# Patient Record
Sex: FEMALE | Race: WHITE | Employment: UNEMPLOYED | URBAN - METROPOLITAN AREA
[De-identification: names, ages, dates, MRNs, and addresses within clinical notes are randomized per-mention and may not be internally consistent; named-entity substitution may affect disease eponyms.]

---

## 2022-07-23 ENCOUNTER — HOSPITAL ENCOUNTER (EMERGENCY)
Age: 62
Discharge: HOME | End: 2022-07-24
Attending: EMERGENCY MEDICINE
Payer: COMMERCIAL

## 2022-07-23 ENCOUNTER — APPOINTMENT (OUTPATIENT)
Dept: CT IMAGING | Age: 62
End: 2022-07-23
Payer: COMMERCIAL

## 2022-07-23 DIAGNOSIS — R42 VERTIGO: Primary | ICD-10-CM

## 2022-07-23 DIAGNOSIS — N39.0 ACUTE UTI: ICD-10-CM

## 2022-07-23 LAB
ALBUMIN SERPL-MCNC: 3.8 G/DL (ref 3.5–4.6)
ALP BLD-CCNC: 94 U/L (ref 40–130)
ALT SERPL-CCNC: 22 U/L (ref 0–33)
ANION GAP SERPL CALCULATED.3IONS-SCNC: 13 MEQ/L (ref 9–15)
AST SERPL-CCNC: 20 U/L (ref 0–35)
BASOPHILS ABSOLUTE: 0 K/UL (ref 0–0.1)
BASOPHILS RELATIVE PERCENT: 0.4 % (ref 0.1–1.2)
BILIRUB SERPL-MCNC: 0.8 MG/DL (ref 0.2–0.7)
BUN BLDV-MCNC: 10 MG/DL (ref 8–23)
CALCIUM SERPL-MCNC: 9.2 MG/DL (ref 8.5–9.9)
CHLORIDE BLD-SCNC: 96 MEQ/L (ref 95–107)
CO2: 24 MEQ/L (ref 20–31)
CREAT SERPL-MCNC: 0.55 MG/DL (ref 0.5–0.9)
EOSINOPHILS ABSOLUTE: 0 K/UL (ref 0–0.4)
EOSINOPHILS RELATIVE PERCENT: 0.4 % (ref 0.7–5.8)
GFR AFRICAN AMERICAN: >60
GFR NON-AFRICAN AMERICAN: >60
GLOBULIN: 2.3 G/DL (ref 2.3–3.5)
GLUCOSE BLD-MCNC: 226 MG/DL (ref 70–99)
HCT VFR BLD CALC: 39.6 % (ref 37–47)
HEMOGLOBIN: 13.9 G/DL (ref 11.2–15.7)
IMMATURE GRANULOCYTES #: 0 K/UL
IMMATURE GRANULOCYTES %: 0.5 %
INFLUENZA A BY PCR: NEGATIVE
INFLUENZA B BY PCR: NEGATIVE
LYMPHOCYTES ABSOLUTE: 1 K/UL (ref 1.2–3.7)
LYMPHOCYTES RELATIVE PERCENT: 13.4 %
MAGNESIUM: 1.7 MG/DL (ref 1.7–2.4)
MCH RBC QN AUTO: 30.5 PG (ref 25.6–32.2)
MCHC RBC AUTO-ENTMCNC: 35.1 % (ref 32.2–35.5)
MCV RBC AUTO: 86.8 FL (ref 79.4–94.8)
MONOCYTES ABSOLUTE: 0.4 K/UL (ref 0.2–0.9)
MONOCYTES RELATIVE PERCENT: 4.9 % (ref 4.7–12.5)
NEUTROPHILS ABSOLUTE: 6 K/UL (ref 1.6–6.1)
NEUTROPHILS RELATIVE PERCENT: 80.4 % (ref 34–71.1)
PDW BLD-RTO: 11.8 % (ref 11.7–14.4)
PLATELET # BLD: 256 K/UL (ref 182–369)
POTASSIUM SERPL-SCNC: 4 MEQ/L (ref 3.4–4.9)
RBC # BLD: 4.56 M/UL (ref 3.93–5.22)
SARS-COV-2, NAAT: NOT DETECTED
SODIUM BLD-SCNC: 133 MEQ/L (ref 135–144)
TOTAL PROTEIN: 6.1 G/DL (ref 6.3–8)
TROPONIN: <0.01 NG/ML (ref 0–0.01)
WBC # BLD: 7.4 K/UL (ref 4–10)

## 2022-07-23 PROCEDURE — 70450 CT HEAD/BRAIN W/O DYE: CPT

## 2022-07-23 PROCEDURE — 6360000002 HC RX W HCPCS: Performed by: EMERGENCY MEDICINE

## 2022-07-23 PROCEDURE — 85025 COMPLETE CBC W/AUTO DIFF WBC: CPT

## 2022-07-23 PROCEDURE — 93005 ELECTROCARDIOGRAM TRACING: CPT

## 2022-07-23 PROCEDURE — 6370000000 HC RX 637 (ALT 250 FOR IP): Performed by: EMERGENCY MEDICINE

## 2022-07-23 PROCEDURE — 87635 SARS-COV-2 COVID-19 AMP PRB: CPT

## 2022-07-23 PROCEDURE — 80053 COMPREHEN METABOLIC PANEL: CPT

## 2022-07-23 PROCEDURE — 96375 TX/PRO/DX INJ NEW DRUG ADDON: CPT

## 2022-07-23 PROCEDURE — 96365 THER/PROPH/DIAG IV INF INIT: CPT

## 2022-07-23 PROCEDURE — 83735 ASSAY OF MAGNESIUM: CPT

## 2022-07-23 PROCEDURE — 87502 INFLUENZA DNA AMP PROBE: CPT

## 2022-07-23 PROCEDURE — 84484 ASSAY OF TROPONIN QUANT: CPT

## 2022-07-23 PROCEDURE — 99284 EMERGENCY DEPT VISIT MOD MDM: CPT

## 2022-07-23 PROCEDURE — 2580000003 HC RX 258: Performed by: EMERGENCY MEDICINE

## 2022-07-23 RX ORDER — ATORVASTATIN CALCIUM 10 MG/1
10 TABLET, FILM COATED ORAL DAILY
COMMUNITY

## 2022-07-23 RX ORDER — 0.9 % SODIUM CHLORIDE 0.9 %
1000 INTRAVENOUS SOLUTION INTRAVENOUS ONCE
Status: COMPLETED | OUTPATIENT
Start: 2022-07-23 | End: 2022-07-24

## 2022-07-23 RX ORDER — METOPROLOL TARTRATE 100 MG/1
100 TABLET ORAL 2 TIMES DAILY
COMMUNITY

## 2022-07-23 RX ORDER — THIAMINE MONONITRATE (VIT B1) 100 MG
100 TABLET ORAL DAILY
COMMUNITY

## 2022-07-23 RX ORDER — LISINOPRIL 10 MG/1
TABLET ORAL DAILY
COMMUNITY

## 2022-07-23 RX ORDER — ONDANSETRON 4 MG/1
4 TABLET, ORALLY DISINTEGRATING ORAL EVERY 8 HOURS PRN
Qty: 20 TABLET | Refills: 0 | Status: SHIPPED | OUTPATIENT
Start: 2022-07-23

## 2022-07-23 RX ORDER — MECLIZINE HCL 12.5 MG/1
12.5 TABLET ORAL 3 TIMES DAILY PRN
Qty: 30 TABLET | Refills: 0 | Status: SHIPPED | OUTPATIENT
Start: 2022-07-23 | End: 2022-08-02

## 2022-07-23 RX ORDER — CALCIUM CARBONATE 500(1250)
500 TABLET ORAL DAILY
COMMUNITY

## 2022-07-23 RX ORDER — ONDANSETRON 2 MG/ML
4 INJECTION INTRAMUSCULAR; INTRAVENOUS ONCE
Status: COMPLETED | OUTPATIENT
Start: 2022-07-23 | End: 2022-07-23

## 2022-07-23 RX ORDER — MECLIZINE HCL 12.5 MG/1
25 TABLET ORAL ONCE
Status: COMPLETED | OUTPATIENT
Start: 2022-07-23 | End: 2022-07-23

## 2022-07-23 RX ADMIN — MECLIZINE 25 MG: 12.5 TABLET ORAL at 23:09

## 2022-07-23 RX ADMIN — ONDANSETRON 4 MG: 2 INJECTION INTRAMUSCULAR; INTRAVENOUS at 23:09

## 2022-07-23 RX ADMIN — SODIUM CHLORIDE 1000 ML: 9 INJECTION, SOLUTION INTRAVENOUS at 23:10

## 2022-07-23 ASSESSMENT — PAIN SCALES - GENERAL
PAINLEVEL_OUTOF10: 0
PAINLEVEL_OUTOF10: 10

## 2022-07-24 VITALS
SYSTOLIC BLOOD PRESSURE: 128 MMHG | WEIGHT: 172 LBS | BODY MASS INDEX: 29.37 KG/M2 | DIASTOLIC BLOOD PRESSURE: 61 MMHG | HEART RATE: 58 BPM | TEMPERATURE: 97.8 F | RESPIRATION RATE: 16 BRPM | OXYGEN SATURATION: 99 % | HEIGHT: 64 IN

## 2022-07-24 LAB
BACTERIA: ABNORMAL /HPF
BILIRUBIN URINE: NEGATIVE
BLOOD, URINE: ABNORMAL
CLARITY: CLEAR
COLOR: YELLOW
EKG ATRIAL RATE: 56 BPM
EKG P AXIS: -1 DEGREES
EKG P-R INTERVAL: 154 MS
EKG Q-T INTERVAL: 462 MS
EKG QRS DURATION: 96 MS
EKG QTC CALCULATION (BAZETT): 445 MS
EKG R AXIS: 55 DEGREES
EKG T AXIS: 42 DEGREES
EKG VENTRICULAR RATE: 56 BPM
EPITHELIAL CELLS, UA: ABNORMAL /HPF
GLUCOSE URINE: 100 MG/DL
KETONES, URINE: 40 MG/DL
LEUKOCYTE ESTERASE, URINE: ABNORMAL
NITRITE, URINE: NEGATIVE
PH UA: 6 (ref 5–9)
PROTEIN UA: 30 MG/DL
RBC UA: ABNORMAL /HPF (ref 0–2)
SPECIFIC GRAVITY UA: 1.02 (ref 1–1.03)
URINE REFLEX TO CULTURE: YES
UROBILINOGEN, URINE: 0.2 E.U./DL
WBC UA: ABNORMAL /HPF (ref 0–5)

## 2022-07-24 PROCEDURE — 81001 URINALYSIS AUTO W/SCOPE: CPT

## 2022-07-24 PROCEDURE — 6360000002 HC RX W HCPCS: Performed by: EMERGENCY MEDICINE

## 2022-07-24 PROCEDURE — 2580000003 HC RX 258: Performed by: EMERGENCY MEDICINE

## 2022-07-24 PROCEDURE — 87086 URINE CULTURE/COLONY COUNT: CPT

## 2022-07-24 PROCEDURE — 93010 ELECTROCARDIOGRAM REPORT: CPT | Performed by: INTERNAL MEDICINE

## 2022-07-24 RX ORDER — CEPHALEXIN 500 MG/1
500 CAPSULE ORAL 4 TIMES DAILY
Qty: 28 CAPSULE | Refills: 0 | Status: SHIPPED | OUTPATIENT
Start: 2022-07-24 | End: 2022-07-31

## 2022-07-24 RX ADMIN — CEFTRIAXONE 1000 MG: 1 INJECTION, POWDER, FOR SOLUTION INTRAMUSCULAR; INTRAVENOUS at 00:37

## 2022-07-24 ASSESSMENT — ENCOUNTER SYMPTOMS
DIARRHEA: 0
COUGH: 0
COLOR CHANGE: 0
SINUS PRESSURE: 0
WHEEZING: 0
NAUSEA: 1
SORE THROAT: 0
SHORTNESS OF BREATH: 0
RHINORRHEA: 0
CONSTIPATION: 0
BACK PAIN: 0
EYE PAIN: 0
PHOTOPHOBIA: 0
APNEA: 0
ABDOMINAL PAIN: 0
ABDOMINAL DISTENTION: 0
VOMITING: 1

## 2022-07-24 NOTE — ED PROVIDER NOTES
[address] [place] [address] ED  eMERGENCY dEPARTMENT eNCOUnter      Pt Name: Edgar Hess  MRN: [id number]  [place] 1960  Date of evaluation: 7/23/2022  Provider: Herman Pacheco MD    CHIEF COMPLAINT       Chief Complaint   Patient presents with    Dizziness    Emesis         HISTORY OF PRESENT ILLNESS   (Location/Symptom, Timing/Onset,Context/Setting, Quality, Duration, Modifying Factors, Severity)  Note limiting factors. Edgar Hess is a 57 y.o. female who presents to the emergency department with complaint of dizziness, vertigo nausea and vomiting since this morning. Patient feels like she is dehydrated. Her vertigo symptoms usually comes with dehydration. Denies ear pain or ringing in the ears. Denies chest pain, palpitations, PND, orthopnea. No dysuria. No cough or congestion. No diarrhea or constipation. No other systemic symptoms. HPI    Nursing Notes were reviewed. REVIEW OF SYSTEMS    (2-9 systems for level 4, 10 or more for level 5)     Review of Systems   Constitutional: Negative. Negative for activity change, appetite change, chills, fatigue and fever. HENT:  Negative for congestion, ear discharge, ear pain, hearing loss, rhinorrhea, sinus pressure and sore throat. Eyes:  Negative for photophobia, pain and visual disturbance. Respiratory:  Negative for apnea, cough, shortness of breath and wheezing. Cardiovascular:  Negative for chest pain, palpitations and leg swelling. Gastrointestinal:  Positive for nausea and vomiting. Negative for abdominal distention, abdominal pain, constipation and diarrhea. Endocrine: Negative for cold intolerance, heat intolerance and polyuria. Genitourinary:  Negative for dysuria, flank pain, frequency and urgency. Musculoskeletal:  Negative for arthralgias, back pain, gait problem, myalgias and neck stiffness. Skin:  Negative for color change, pallor and rash. Allergic/Immunologic: Negative for food allergies and immunocompromised state. Neurological:  Positive for dizziness. Negative for tremors, syncope, weakness, light-headedness and headaches. Psychiatric/Behavioral:  Negative for agitation, confusion and hallucinations. All other systems reviewed and are negative. Except as noted above the remainder of the review of systems was reviewed and negative. PAST MEDICAL HISTORY     Past Medical History:   Diagnosis Date    Diabetes mellitus ([facility] [address]     Hyperlipidemia     Hypertension          SURGICAL HISTORY       Past Surgical History:   Procedure Laterality Date     SECTION      HYSTERECTOMY (CERVIX STATUS UNKNOWN)      SHOULDER ARTHROSCOPY           CURRENT MEDICATIONS       Discharge Medication List as of 2022 12:35 AM        CONTINUE these medications which have NOT CHANGED    Details   calcium carbonate (OSCAL) 500 MG TABS tablet Take 500 mg by mouth in the morning. Historical Med      vitamin B-1 (THIAMINE) 100 MG tablet Take 100 mg by mouth in the morning. Historical Med      metFORMIN (GLUCOPHAGE) 1000 MG tablet Take by mouth 2 times daily (with meals)Historical Med      lisinopril (PRINIVIL;ZESTRIL) 10 MG tablet Take by mouth dailyHistorical Med      atorvastatin (LIPITOR) 10 MG tablet Take 10 mg by mouth in the morning. Historical Med      metoprolol (LOPRESSOR) 100 MG tablet Take 100 mg by mouth in the morning and 100 mg before bedtime. Historical Med             ALLERGIES     Penicillins and Sulfa antibiotics    FAMILY HISTORY     History reviewed. No pertinent family history.        SOCIAL HISTORY       Social History     Socioeconomic History    Marital status:      Spouse name: None    Number of children: None    Years of education: None    Highest education level: None   Tobacco Use    Smoking status: Never     Passive exposure: Never    Smokeless tobacco: Never   Substance and Sexual Activity    Alcohol use: Yes     Comment: rare    Drug use: Never       SCREENINGS    Aly Coma Scale  Eye Opening: Spontaneous  Best Verbal Response: Oriented  Best Motor Response: Obeys commands  Aly Coma Scale Score: 15        PHYSICAL EXAM    (up to 7 for level 4, 8 or more for level 5)     ED Triage Vitals [07/23/22 2245]   BP Temp Temp Source Heart Rate Resp SpO2 Height Weight   (!) 158/81 97.8 °F (36.6 °C) Oral 57 16 97 % 5' 4\" (1.626 m) 172 lb (78 kg)       Physical Exam  Vitals and nursing note reviewed. Constitutional:       General: She is not in acute distress. Appearance: Normal appearance. She is well-developed and normal weight. She is not ill-appearing, toxic-appearing or diaphoretic. HENT:      Head: Normocephalic and atraumatic. Right Ear: Tympanic membrane, ear canal and external ear normal. There is no impacted cerumen. Left Ear: Tympanic membrane, ear canal and external ear normal. There is no impacted cerumen. Nose: Nose normal. No congestion or rhinorrhea. Mouth/Throat:      Mouth: Mucous membranes are moist.      Pharynx: Oropharynx is clear. No oropharyngeal exudate or posterior oropharyngeal erythema. Eyes:      General: No scleral icterus. Right eye: No discharge. Left eye: No discharge. Extraocular Movements: Extraocular movements intact. Conjunctiva/sclera: Conjunctivae normal.      Pupils: Pupils are equal, round, and reactive to light. Neck:      Thyroid: No thyromegaly. Vascular: No carotid bruit or JVD. Trachea: No tracheal deviation. Cardiovascular:      Rate and Rhythm: Normal rate and regular rhythm. Pulses: Normal pulses. Heart sounds: Normal heart sounds. No murmur heard. No friction rub. No gallop. Pulmonary:      Effort: Pulmonary effort is normal. No respiratory distress. Breath sounds: Normal breath sounds. No stridor. No wheezing, rhonchi or rales. Chest:      Chest wall: No tenderness. Abdominal:      General: Abdomen is flat. Bowel sounds are normal. There is no distension.       Palpations: Abdomen is soft. There is no mass. Tenderness: There is no abdominal tenderness. There is no right CVA tenderness, left CVA tenderness, guarding or rebound. Hernia: No hernia is present. Musculoskeletal:         General: No swelling, tenderness, deformity or signs of injury. Normal range of motion. Cervical back: Normal range of motion and neck supple. No rigidity or tenderness. Right lower leg: No edema. Left lower leg: No edema. Lymphadenopathy:      Cervical: No cervical adenopathy. Skin:     General: Skin is warm and dry. Capillary Refill: Capillary refill takes less than 2 seconds. Coloration: Skin is not jaundiced or pale. Findings: No bruising, erythema, lesion or rash. Neurological:      General: No focal deficit present. Mental Status: She is alert and oriented to person, place, and time. Mental status is at baseline. Cranial Nerves: No cranial nerve deficit. Sensory: No sensory deficit. Motor: No weakness or abnormal muscle tone. Coordination: Coordination normal.      Gait: Gait normal.      Deep Tendon Reflexes: Reflexes are normal and symmetric. Reflexes normal.   Psychiatric:         Mood and Affect: Mood normal.         Behavior: Behavior normal.         Thought Content: Thought content normal.         Judgment: Judgment normal.       DIAGNOSTIC RESULTS     EKG: All EKG's are interpreted by the Emergency Department Physician who either signs or Co-signs this chart in the absence of a cardiologist.    Twelve-lead EKG shows sinus bradycardia, rate 56 bpm, normal intervals, normal electrical axis, no acute ST-T wave changes.     RADIOLOGY:   Non-plain film images such as CT, Ultrasound and MRI are read by the radiologist. [facility] radiographicimages are visualized and preliminarily interpreted by the emergency physician with the below findings:        Interpretation per the Radiologist below, if available at the time of this note:    CT Head WO Contrast   Final Result     No acute intracranial pathology. ED BEDSIDE ULTRASOUND:   Performed by ED Physician - none    LABS:  Labs Reviewed   COMPREHENSIVE METABOLIC PANEL - Abnormal; Notable for the following components:       Result Value    Sodium 133 (*)     Glucose 226 (*)     Total Protein 6.1 (*)     Total Bilirubin 0.8 (*)     All other components within normal limits   CBC WITH AUTO DIFFERENTIAL - Abnormal; Notable for the following components:    Neutrophils % 80.4 (*)     Eosinophils % 0.4 (*)     Lymphocytes Absolute 1.0 (*)     All other components within normal limits   URINALYSIS WITH REFLEX TO CULTURE - Abnormal; Notable for the following components:    Glucose, Ur 100 (*)     Ketones, Urine 40 (*)     Protein, UA 30 (*)     All other components within normal limits   MICROSCOPIC URINALYSIS - Abnormal; Notable for the following components:    Bacteria, UA RARE (*)     All other components within normal limits   COVID-19, RAPID   RAPID INFLUENZA A/B ANTIGENS   CULTURE, URINE   MAGNESIUM   TROPONIN       All other labs were within normal range or not returned as of this dictation.     EMERGENCY DEPARTMENT COURSE and DIFFERENTIALDIAGNOSIS/MDM:   Vitals:    Vitals:    07/23/22 2245 07/23/22 2344 07/23/22 2345 07/24/22 0115   BP: (!) 158/81 (!) 140/66 137/72 128/61   Pulse: 57 60  58   Resp: 16 16  16   Temp: 97.8 °F (36.6 °C)      TempSrc: Oral      SpO2: 97% 100% 100% 99%   Weight: 172 lb (78 kg)      Height: 5' 4\" (1.626 m)              MDM     Amount and/or Complexity of Data Reviewed  Clinical lab tests: reviewed and ordered  Tests in the radiology section of CPT®: reviewed and ordered  Tests in the medicine section of CPT®: reviewed and ordered    Risk of Complications, Morbidity, and/or Mortality  Presenting problems: moderate  Diagnostic procedures: moderate  Management options: moderate    Patient Progress  Patient progress: improved      CRITICAL CARE TIME   Total Critical Care time was  minutes, excluding separately reportable procedures. There was a high probability of clinically significant/life threatening deterioration in the patient's condition which required my urgentintervention. CONSULTS:  None    PROCEDURES:  Unless otherwise noted below, none     Procedures    FINAL IMPRESSION      1. Vertigo    2. Acute UTI          DISPOSITION/PLAN   DISPOSITION Decision To Discharge 07/24/2022 01:23:36 AM      PATIENT REFERRED TO:  Derick Jacques MD  [facility] [address]   [phone]    In 3 days      DISCHARGE MEDICATIONS:  Discharge Medication List as of 7/24/2022 12:35 AM        START taking these medications    Details   cephALEXin (KEFLEX) 500 MG capsule Take 1 capsule by mouth in the morning and 1 capsule at noon and 1 capsule in the evening and 1 capsule before bedtime. Do all this for 7 days. , Disp-28 capsule, R-0Print      meclizine (ANTIVERT) 12.5 MG tablet Take 1 tablet by mouth 3 times daily as needed for Dizziness (Vertigo), Disp-30 tablet, R-0Print      ondansetron (ZOFRAN ODT) 4 MG disintegrating tablet Take 1 tablet by mouth every 8 hours as needed for Nausea, Disp-20 tablet, R-0Print                (Please note that portions of this note were completed with a voice recognitionprogram.  Efforts were made to edit the dictations but occasionally words are mis-transcribed.)    Alba Ansari MD (electronically signed)  Attending Emergency Physician          Alba Ansari MD  07/24/22 9301

## 2022-07-25 LAB — URINE CULTURE, ROUTINE: NORMAL
